# Patient Record
Sex: FEMALE | Race: WHITE | Employment: UNEMPLOYED | URBAN - METROPOLITAN AREA
[De-identification: names, ages, dates, MRNs, and addresses within clinical notes are randomized per-mention and may not be internally consistent; named-entity substitution may affect disease eponyms.]

---

## 2019-08-20 ENCOUNTER — APPOINTMENT (OUTPATIENT)
Dept: PHYSICAL THERAPY | Age: 59
End: 2019-08-20

## 2019-08-23 ENCOUNTER — HOSPITAL ENCOUNTER (OUTPATIENT)
Dept: PHYSICAL THERAPY | Age: 59
Discharge: HOME | End: 2019-08-23
Payer: OTHER GOVERNMENT

## 2019-08-23 PROCEDURE — 97162 PT EVAL MOD COMPLEX 30 MIN: CPT

## 2019-08-23 PROCEDURE — 97140 MANUAL THERAPY 1/> REGIONS: CPT

## 2019-08-23 NOTE — PROGRESS NOTES
PHYSICAL THERAPY - DAILY TREATMENT NOTE    Patient Name: Eunice Adams        Date: 2019  : 1960   YES Patient  Verified  Visit #:     Insurance: Payor:  / Plan: Giacomo Shannon DEPENDENTS / Product Type:  /      In time: 11:00 Out time: 11:40   Total Treatment Time: 40     Medicare Time Tracking (below)   Total Timed Codes (min):  na 1:1 Treatment Time:  na     TREATMENT AREA =  Right knee pain [M25.561]    SUBJECTIVE  Pain Level (on 0 to 10 scale):    Medication Changes/New allergies or changes in medical history, any new surgeries or procedures? NO    If yes, update Summary List   Subjective Functional Status/Changes:  []  No changes reported     SEE IE          OBJECTIVE      15 min Manual Therapy: L5 L FRS, L on L SI karli   Rationale:      decrease pain, increase ROM and increase tissue extensibility to improve patient's ability to perform ADL    5 min Self Care: Bridge, SL hip abd   Rationale:    increase ROM, increase strength and improve coordination to improve the patients ability to perform ADLs      Billed With/As:   [] TE   [] TA   [] Neuro   [x] Self Care Patient Education: [x] Review HEP    [] Progressed/Changed HEP based on:   [] positioning   [] body mechanics   [] transfers   [] heat/ice application    [] other:       min Patient Education:  YES  Reviewed HEP   []  Progressed/Changed HEP based on:         Other Objective/Functional Measures:    SEE IE     Post Treatment Pain Level (on 0 to 10) scale:       ASSESSMENT  Assessment/Changes in Function:     SEE IE     []  See Progress Note/Recertification   Patient will continue to benefit from skilled PT services to modify and progress therapeutic interventions, address functional mobility deficits, address ROM deficits, address strength deficits, analyze and address soft tissue restrictions, analyze and cue movement patterns, analyze and modify body mechanics/ergonomics and assess and modify postural abnormalities to attain remaining goals.    Progress toward goals / Updated goals:         PLAN  []  Upgrade activities as tolerated YES Continue plan of care   []  Discharge due to :    []  Other:      Therapist: Krista Vuong, PT, OCS, SCS, CSCS    Date: 8/23/2019 Time: 10:57 AM       Future Appointments   Date Time Provider Jordi Cotton   8/23/2019 11:00 AM Rosalee Deng, PT [facility]

## 2019-08-23 NOTE — PROGRESS NOTES
[facility] PHYSICAL THERAPY  [address] [place] [address] [address],[facility], [facility] - Phone: [phone]  Fax: [phone] / [phone] [facility]  Patient Name: Drew Myers : 1960   Medical   Diagnosis: Right knee pain [M25.561] Treatment Diagnosis: Right knee pain [M25.561]   Onset Date: ~6wks ago     Referral Source: Sol Escalona MD Start of Care [facility]): 2019   Prior Hospitalization: See medical history Provider #: [id number]   Prior Level of Function: Pain free walking   Comorbidities: Depression, HTN   Medications: Verified on Patient Summary List   The Plan of Care and following information is based on the information from the initial evaluation.   ===========================================================================================  Assessment / key information:  Drew Myers is a 61 y.o.  yo female with Dx of Right knee pain [M25.561]. She reports the onset of B knee pain after increasing the mileage for walking. She currently rates her pain as 8/10 at worst, 0/10 at best, primarily located at B patellar tendon. She complains of difficulty and increase pain with stairs and prolonged amb. Objective Findings:   Manual Muscle Testing: R hip abd and ext are Reduced. All other LE strength are WNL. Lumbar/SI Screening: L5 L FRS, L on L SI innominate noted. Pt instructed in HEP and will f/u in clinic for PT.  ===========================================================================================  Eval Complexity: History MEDIUM  Complexity : 1-2 comorbidities / personal factors will impact the outcome/ POC ;  Examination  MEDIUM Complexity : 3 Standardized tests and measures addressing body structure, function, activity limitation and / or participation in recreation ; Presentation MEDIUM Complexity : Evolving with changing characteristics ;   Decision Making MEDIUM Complexity : FOTO score of 26-74; Overall Complexity MEDIUM  Problem List: pain affecting function, decrease ROM, decrease strength, impaired gait/ balance, decrease ADL/ functional abilitiies, decrease activity tolerance and decrease flexibility/ joint mobility   Treatment Plan may include any combination of the following: Therapeutic exercise, Therapeutic activities, Neuromuscular re-education, Physical agent/modality, Manual therapy, Patient education, Self Care training and Functional mobility training  Patient / Family readiness to learn indicated by: asking questions  Persons(s) to be included in education: patient (P)  Barriers to Learning/Limitations: None  Measures taken, if barriers to learning:    Patient Goal (s): Decrease pain    Patient self reported health status: good  Rehabilitation Potential: good   Short Term Goals: To be accomplished in  1-2  weeks:  1. Independent with HEP. 2. Decrease max pain 25-50% to assist with amb   Long Term Goals: To be accomplished in  3-4  weeks:  1. Decrease max pain 50-75% to assist with amb  2. Increase FOTO score to 58% to show functional improvment. 3.  Will rate  >/= +5 on Global Rating of Change and be prepared to DC to HEP. Frequency / Duration:   Patient to be seen  2-3  times per week for 3-4  weeks:  Patient / Caregiver education and instruction: self care and exercises    Therapist Signature: Macario Toscano, FELIX, OCS, SCS, CSCS Date: 2/40/6833   Certification Period: na Time: 10:32 AM   ===========================================================================================  I certify that the above Physical Therapy Services are being furnished while the patient is under my care. I agree with the treatment plan and certify that this therapy is necessary. Physician Signature:        Date:       Time:     Please sign and return to [facility] [place] or you may fax the signed copy to [phone]. Thank you.

## 2019-09-03 ENCOUNTER — APPOINTMENT (OUTPATIENT)
Dept: PHYSICAL THERAPY | Age: 59
End: 2019-09-03

## 2019-09-05 ENCOUNTER — APPOINTMENT (OUTPATIENT)
Dept: PHYSICAL THERAPY | Age: 59
End: 2019-09-05

## 2019-09-09 ENCOUNTER — APPOINTMENT (OUTPATIENT)
Dept: PHYSICAL THERAPY | Age: 59
End: 2019-09-09

## 2019-09-13 ENCOUNTER — APPOINTMENT (OUTPATIENT)
Dept: PHYSICAL THERAPY | Age: 59
End: 2019-09-13

## 2019-09-16 ENCOUNTER — APPOINTMENT (OUTPATIENT)
Dept: PHYSICAL THERAPY | Age: 59
End: 2019-09-16

## 2019-09-19 ENCOUNTER — HOSPITAL ENCOUNTER (OUTPATIENT)
Dept: PHYSICAL THERAPY | Age: 59
End: 2019-09-19

## 2021-01-19 ENCOUNTER — IMPORTED ENCOUNTER (OUTPATIENT)
Dept: URBAN - NONMETROPOLITAN AREA CLINIC 1 | Facility: CLINIC | Age: 61
End: 2021-01-19

## 2021-01-19 PROBLEM — H10.212: Noted: 2021-01-19

## 2021-01-19 PROCEDURE — 92002 INTRM OPH EXAM NEW PATIENT: CPT

## 2022-04-15 ASSESSMENT — VISUAL ACUITY
OD_SC: 20/20
OS_SC: 20/100-1